# Patient Record
Sex: FEMALE | Race: WHITE | NOT HISPANIC OR LATINO | Employment: FULL TIME | URBAN - METROPOLITAN AREA
[De-identification: names, ages, dates, MRNs, and addresses within clinical notes are randomized per-mention and may not be internally consistent; named-entity substitution may affect disease eponyms.]

---

## 2017-05-10 RX ORDER — MESALAMINE 1.2 G/1
TABLET, DELAYED RELEASE ORAL
Qty: 180 TABLET | Refills: 2 | OUTPATIENT
Start: 2017-05-10

## 2017-09-26 ENCOUNTER — OFFICE VISIT (OUTPATIENT)
Dept: GASTROENTEROLOGY | Facility: CLINIC | Age: 37
End: 2017-09-26

## 2017-09-26 VITALS
WEIGHT: 132.6 LBS | HEIGHT: 63 IN | BODY MASS INDEX: 23.5 KG/M2 | TEMPERATURE: 98.2 F | SYSTOLIC BLOOD PRESSURE: 112 MMHG | DIASTOLIC BLOOD PRESSURE: 64 MMHG

## 2017-09-26 DIAGNOSIS — Z86.2 HISTORY OF ANEMIA: ICD-10-CM

## 2017-09-26 DIAGNOSIS — K21.9 GASTROESOPHAGEAL REFLUX DISEASE, ESOPHAGITIS PRESENCE NOT SPECIFIED: ICD-10-CM

## 2017-09-26 DIAGNOSIS — K51.211 ULCERATIVE PROCTITIS WITH RECTAL BLEEDING (HCC): Primary | ICD-10-CM

## 2017-09-26 LAB
ALBUMIN SERPL-MCNC: 4.1 G/DL (ref 3.5–5.2)
ALBUMIN/GLOB SERPL: 1.5 G/DL
ALP SERPL-CCNC: 40 U/L (ref 39–117)
ALT SERPL-CCNC: 9 U/L (ref 1–33)
AST SERPL-CCNC: 13 U/L (ref 1–32)
BASOPHILS # BLD AUTO: 0.01 10*3/MM3 (ref 0–0.2)
BASOPHILS NFR BLD AUTO: 0.1 % (ref 0–1.5)
BILIRUB SERPL-MCNC: 0.5 MG/DL (ref 0.1–1.2)
BUN SERPL-MCNC: 12 MG/DL (ref 6–20)
BUN/CREAT SERPL: 15 (ref 7–25)
CALCIUM SERPL-MCNC: 10 MG/DL (ref 8.6–10.5)
CHLORIDE SERPL-SCNC: 103 MMOL/L (ref 98–107)
CO2 SERPL-SCNC: 25.9 MMOL/L (ref 22–29)
CREAT SERPL-MCNC: 0.8 MG/DL (ref 0.57–1)
CRP SERPL-MCNC: <0.03 MG/DL (ref 0–0.5)
EOSINOPHIL # BLD AUTO: 0.12 10*3/MM3 (ref 0–0.7)
EOSINOPHIL NFR BLD AUTO: 1.6 % (ref 0.3–6.2)
ERYTHROCYTE [DISTWIDTH] IN BLOOD BY AUTOMATED COUNT: 13.1 % (ref 11.7–13)
ERYTHROCYTE [SEDIMENTATION RATE] IN BLOOD BY WESTERGREN METHOD: 6 MM/HR (ref 0–20)
GLOBULIN SER CALC-MCNC: 2.7 GM/DL
GLUCOSE SERPL-MCNC: 82 MG/DL (ref 65–99)
HCT VFR BLD AUTO: 39.2 % (ref 35.6–45.5)
HGB BLD-MCNC: 12.6 G/DL (ref 11.9–15.5)
IMM GRANULOCYTES # BLD: 0.02 10*3/MM3 (ref 0–0.03)
IMM GRANULOCYTES NFR BLD: 0.3 % (ref 0–0.5)
IRON SATN MFR SERPL: 31 % (ref 20–50)
IRON SERPL-MCNC: 102 MCG/DL (ref 37–145)
LYMPHOCYTES # BLD AUTO: 1.96 10*3/MM3 (ref 0.9–4.8)
LYMPHOCYTES NFR BLD AUTO: 26 % (ref 19.6–45.3)
MCH RBC QN AUTO: 30 PG (ref 26.9–32)
MCHC RBC AUTO-ENTMCNC: 32.1 G/DL (ref 32.4–36.3)
MCV RBC AUTO: 93.3 FL (ref 80.5–98.2)
MONOCYTES # BLD AUTO: 0.49 10*3/MM3 (ref 0.2–1.2)
MONOCYTES NFR BLD AUTO: 6.5 % (ref 5–12)
NEUTROPHILS # BLD AUTO: 4.93 10*3/MM3 (ref 1.9–8.1)
NEUTROPHILS NFR BLD AUTO: 65.5 % (ref 42.7–76)
PLATELET # BLD AUTO: 292 10*3/MM3 (ref 140–500)
POTASSIUM SERPL-SCNC: 4.8 MMOL/L (ref 3.5–5.2)
PROT SERPL-MCNC: 6.8 G/DL (ref 6–8.5)
RBC # BLD AUTO: 4.2 10*6/MM3 (ref 3.9–5.2)
SODIUM SERPL-SCNC: 140 MMOL/L (ref 136–145)
TIBC SERPL-MCNC: 332 MCG/DL
UIBC SERPL-MCNC: 230 MCG/DL
WBC # BLD AUTO: 7.53 10*3/MM3 (ref 4.5–10.7)

## 2017-09-26 PROCEDURE — 99214 OFFICE O/P EST MOD 30 MIN: CPT | Performed by: NURSE PRACTITIONER

## 2017-09-26 RX ORDER — MESALAMINE 4 G/60ML
4 ENEMA RECTAL SEE ADMIN INSTRUCTIONS
Qty: 30 BOTTLE | Refills: 5 | Status: SHIPPED | OUTPATIENT
Start: 2017-09-26 | End: 2017-12-07 | Stop reason: SDUPTHER

## 2017-09-26 RX ORDER — RANITIDINE 150 MG/1
150 TABLET ORAL 2 TIMES DAILY
Qty: 60 TABLET | Refills: 11 | Status: SHIPPED | OUTPATIENT
Start: 2017-09-26

## 2017-09-26 RX ORDER — MESALAMINE 1.2 G/1
1200 TABLET, DELAYED RELEASE ORAL 4 TIMES DAILY
Qty: 120 TABLET | Refills: 11 | Status: SHIPPED | OUTPATIENT
Start: 2017-09-26 | End: 2019-02-20 | Stop reason: SDUPTHER

## 2017-09-26 NOTE — PROGRESS NOTES
Chief Complaint   Patient presents with   • Follow-up     medication refill, check up    • Heartburn         HPI    Pt is being seen today for A follow up for UC proctitis.  She is followed by Dr. Street in last seen in the office in February 2016.  Treatment includes Rowasa enemas and Lialda daily.  She is been out of her medications for several months.  As a result she states she is having a mild flare.  Flare consisted of one to 2 bloody semi-loose stools per day with mucus.  Mild to moderate amount of blood in stool . She denies abdominal pain, fever, chills, weight loss, nocturnal awakenings.  She denies extraintestinal manifestations.  Her current GI symptoms are not interfering with her daily life.  She reports a history of anemia but states she has not felt increased fatigue, dizziness, or lightheadedness. She is due for colonoscopy as her last one was in 2011.  She her reports frequent heartburn and reflux. She has tried altering her diet but continues to have daily heartburn.  She does drink several cups of coffee or day.  She denies NSAID use.  She denies hematemesis or melena.  She denies early satiety, anorexia, dysphagia or odynophagia..     Past Medical History:   Diagnosis Date   • Anemia    • UC (ulcerative colitis)     dx in 2006     Past Surgical History:   Procedure Laterality Date   • COLONOSCOPY  12/02/2011    revealed some active colitis (found in a note from [facility] in [facility])       Current Outpatient Prescriptions   Medication Sig Dispense Refill   • mesalamine (LIALDA) 1.2 g EC tablet Take 1 tablet by mouth 4 (Four) Times a Day. 120 tablet 11   • mesalamine (ROWASA) 4 g enema Insert 1 enema into the rectum See Admin Instructions. One enema in rectum nightly, try and retain overnight 30 bottle 5   • raNITIdine (ZANTAC) 150 MG tablet Take 1 tablet by mouth 2 (Two) Times a Day. 60 tablet 11     No current facility-administered medications for this visit.        PRN Meds:.    No Known  "Allergies    Social History     Social History   • Marital status:      Spouse name: N/A   • Number of children: N/A   • Years of education: N/A     Occupational History   • Not on file.     Social History Main Topics   • Smoking status: Never Smoker   • Smokeless tobacco: Not on file   • Alcohol use Yes   • Drug use: Not on file   • Sexual activity: Not on file     Other Topics Concern   • Not on file     Social History Narrative       History reviewed. No pertinent family history.    Review of Systems   Constitutional: Negative for appetite change, chills, diaphoresis, fatigue, fever and unexpected weight change.   HENT: Negative for trouble swallowing.    Respiratory: Negative for choking and shortness of breath.    Cardiovascular: Negative for chest pain.   Gastrointestinal: Positive for blood in stool and diarrhea. Negative for abdominal distention, abdominal pain, constipation, nausea and vomiting.        Mucus stools   Musculoskeletal: Negative for back pain.   Skin: Negative for color change.   Allergic/Immunologic: Negative for immunocompromised state.   Neurological: Negative for dizziness.   Hematological: Does not bruise/bleed easily.   Psychiatric/Behavioral: Negative.        Vitals:    09/26/17 0817   BP: 112/64   Temp: 98.2 °F (36.8 °C)     /64 (BP Location: Left arm, Patient Position: Sitting)  Temp 98.2 °F (36.8 °C) (Oral)   Ht 63\" (160 cm)  Wt 132 lb 9.6 oz (60.1 kg)  BMI 23.49 kg/m2  Physical Exam   Constitutional: She is oriented to person, place, and time. She appears well-developed and well-nourished.   HENT:   Head: Normocephalic and atraumatic.   Eyes: Pupils are equal, round, and reactive to light.   Cardiovascular: Normal rate, regular rhythm and normal heart sounds.    Pulmonary/Chest: Effort normal and breath sounds normal.   Abdominal: Soft. Bowel sounds are normal. She exhibits no shifting dullness, no distension, no pulsatile liver, no fluid wave, no abdominal bruit, " no ascites, no pulsatile midline mass and no mass. There is no hepatosplenomegaly. There is no tenderness. There is no rigidity and no guarding. No hernia.   Musculoskeletal: Normal range of motion.   Neurological: She is alert and oriented to person, place, and time.   Skin: Skin is warm and dry.   Psychiatric: She has a normal mood and affect. Her behavior is normal. Thought content normal.   Nursing note and vitals reviewed.      ASSESSMENT AND PLAN    Dione was seen today for follow-up and heartburn.    Diagnoses and all orders for this visit:    Ulcerative proctitis with rectal bleeding  Comments:  start lialda 4 tabs daily. If no improvment in 2-4 weeks consider steroid taper.   Orders:  -     Case Request; Standing  -     Case Request  -     Comprehensive Metabolic Panel  -     Sedimentation Rate  -     C-reactive Protein    Gastroesophageal reflux disease, esophagitis presence not specified  Comments:  ranitidine BID  Orders:  -     Case Request; Standing  -     Case Request    History of anemia  -     CBC & Differential  -     Iron Profile    Other orders  -     Implement Anesthesia orders day of procedure.; Standing  -     Obtain informed consent; Standing  -     Verify bowel prep was successful; Standing  -     Give tap water enema if bowel prep was insufficient; Standing  -     mesalamine (ROWASA) 4 g enema; Insert 1 enema into the rectum See Admin Instructions. One enema in rectum nightly, try and retain overnight  -     mesalamine (LIALDA) 1.2 g EC tablet; Take 1 tablet by mouth 4 (Four) Times a Day.  -     raNITIdine (ZANTAC) 150 MG tablet; Take 1 tablet by mouth 2 (Two) Times a Day.      EGD and colonoscopy with Dr Street. Check routine labs.   Please begin a probiotic.  You can try activia yogurt, align, or florastor.  These can be found over-the-counter at a local grocery store.    For any additional questions, concerns or changes to your condition after today's office visit please contact the  office at [phone].        Irlanda Moreno  APRMAG   [facility]  [facility] [place] [address]  Office: [phone]

## 2017-09-29 ENCOUNTER — TELEPHONE (OUTPATIENT)
Dept: GASTROENTEROLOGY | Facility: CLINIC | Age: 37
End: 2017-09-29

## 2017-09-29 NOTE — TELEPHONE ENCOUNTER
Called pt and advised that per Irlanda: her labs look fine- no inflammation or anemia and no indication for steroids. Pt verb understanding.

## 2017-09-29 NOTE — TELEPHONE ENCOUNTER
----- Message from MISBAH Gastelum sent at 9/27/2017  1:29 PM EDT -----  Please inform patient labs look fine- no inflammation or anemia..  No indication for steroids

## 2017-12-07 RX ORDER — MESALAMINE 4 G/60ML
ENEMA RECTAL
Qty: 1680 ML | Refills: 5 | Status: SHIPPED | OUTPATIENT
Start: 2017-12-07 | End: 2021-12-16

## 2018-02-21 PROBLEM — K21.9 GASTROESOPHAGEAL REFLUX DISEASE: Status: ACTIVE | Noted: 2018-02-21

## 2018-02-21 PROBLEM — K51.211 ULCERATIVE PROCTITIS WITH RECTAL BLEEDING (HCC): Status: ACTIVE | Noted: 2018-02-21

## 2018-03-19 ENCOUNTER — ANESTHESIA (OUTPATIENT)
Dept: GASTROENTEROLOGY | Facility: HOSPITAL | Age: 38
End: 2018-03-19

## 2018-03-19 ENCOUNTER — ANESTHESIA EVENT (OUTPATIENT)
Dept: GASTROENTEROLOGY | Facility: HOSPITAL | Age: 38
End: 2018-03-19

## 2018-03-19 ENCOUNTER — HOSPITAL ENCOUNTER (OUTPATIENT)
Facility: HOSPITAL | Age: 38
Setting detail: HOSPITAL OUTPATIENT SURGERY
Discharge: HOME | End: 2018-03-19
Attending: INTERNAL MEDICINE | Admitting: INTERNAL MEDICINE

## 2018-03-19 VITALS
SYSTOLIC BLOOD PRESSURE: 95 MMHG | HEART RATE: 59 BPM | TEMPERATURE: 97.9 F | HEIGHT: 63 IN | DIASTOLIC BLOOD PRESSURE: 65 MMHG | RESPIRATION RATE: 14 BRPM | OXYGEN SATURATION: 100 % | WEIGHT: 125.13 LBS | BODY MASS INDEX: 22.17 KG/M2

## 2018-03-19 DIAGNOSIS — K21.9 GASTROESOPHAGEAL REFLUX DISEASE, ESOPHAGITIS PRESENCE NOT SPECIFIED: ICD-10-CM

## 2018-03-19 DIAGNOSIS — K51.211 ULCERATIVE PROCTITIS WITH RECTAL BLEEDING (HCC): ICD-10-CM

## 2018-03-19 LAB
B-HCG UR QL: NEGATIVE
INTERNAL NEGATIVE CONTROL: NEGATIVE
INTERNAL POSITIVE CONTROL: POSITIVE
Lab: NORMAL

## 2018-03-19 PROCEDURE — 45380 COLONOSCOPY AND BIOPSY: CPT | Performed by: INTERNAL MEDICINE

## 2018-03-19 PROCEDURE — S0260 H&P FOR SURGERY: HCPCS | Performed by: INTERNAL MEDICINE

## 2018-03-19 PROCEDURE — 88305 TISSUE EXAM BY PATHOLOGIST: CPT | Performed by: INTERNAL MEDICINE

## 2018-03-19 PROCEDURE — 25010000002 PROPOFOL 10 MG/ML EMULSION: Performed by: ANESTHESIOLOGY

## 2018-03-19 PROCEDURE — 43239 EGD BIOPSY SINGLE/MULTIPLE: CPT | Performed by: INTERNAL MEDICINE

## 2018-03-19 RX ORDER — PROMETHAZINE HYDROCHLORIDE 25 MG/ML
6.25 INJECTION, SOLUTION INTRAMUSCULAR; INTRAVENOUS ONCE AS NEEDED
Status: DISCONTINUED | OUTPATIENT
Start: 2018-03-19 | End: 2018-03-19 | Stop reason: HOSPADM

## 2018-03-19 RX ORDER — PROPOFOL 10 MG/ML
VIAL (ML) INTRAVENOUS AS NEEDED
Status: DISCONTINUED | OUTPATIENT
Start: 2018-03-19 | End: 2018-03-19 | Stop reason: SURG

## 2018-03-19 RX ORDER — SODIUM CHLORIDE, SODIUM LACTATE, POTASSIUM CHLORIDE, CALCIUM CHLORIDE 600; 310; 30; 20 MG/100ML; MG/100ML; MG/100ML; MG/100ML
1000 INJECTION, SOLUTION INTRAVENOUS CONTINUOUS PRN
Status: DISCONTINUED | OUTPATIENT
Start: 2018-03-19 | End: 2018-03-19 | Stop reason: HOSPADM

## 2018-03-19 RX ORDER — PROMETHAZINE HYDROCHLORIDE 25 MG/1
25 TABLET ORAL ONCE AS NEEDED
Status: DISCONTINUED | OUTPATIENT
Start: 2018-03-19 | End: 2018-03-19 | Stop reason: HOSPADM

## 2018-03-19 RX ORDER — LIDOCAINE HYDROCHLORIDE 20 MG/ML
INJECTION, SOLUTION INFILTRATION; PERINEURAL AS NEEDED
Status: DISCONTINUED | OUTPATIENT
Start: 2018-03-19 | End: 2018-03-19 | Stop reason: SURG

## 2018-03-19 RX ORDER — LIDOCAINE HYDROCHLORIDE 10 MG/ML
0.5 INJECTION, SOLUTION INFILTRATION; PERINEURAL ONCE AS NEEDED
Status: DISCONTINUED | OUTPATIENT
Start: 2018-03-19 | End: 2018-03-19 | Stop reason: HOSPADM

## 2018-03-19 RX ORDER — SODIUM CHLORIDE 0.9 % (FLUSH) 0.9 %
3 SYRINGE (ML) INJECTION AS NEEDED
Status: DISCONTINUED | OUTPATIENT
Start: 2018-03-19 | End: 2018-03-19 | Stop reason: HOSPADM

## 2018-03-19 RX ORDER — PROMETHAZINE HYDROCHLORIDE 25 MG/1
25 SUPPOSITORY RECTAL ONCE AS NEEDED
Status: DISCONTINUED | OUTPATIENT
Start: 2018-03-19 | End: 2018-03-19 | Stop reason: HOSPADM

## 2018-03-19 RX ORDER — PROPOFOL 10 MG/ML
VIAL (ML) INTRAVENOUS CONTINUOUS PRN
Status: DISCONTINUED | OUTPATIENT
Start: 2018-03-19 | End: 2018-03-19 | Stop reason: SURG

## 2018-03-19 RX ORDER — ONDANSETRON 2 MG/ML
4 INJECTION INTRAMUSCULAR; INTRAVENOUS ONCE AS NEEDED
Status: DISCONTINUED | OUTPATIENT
Start: 2018-03-19 | End: 2018-03-19 | Stop reason: HOSPADM

## 2018-03-19 RX ADMIN — LIDOCAINE HYDROCHLORIDE 60 MG: 20 INJECTION, SOLUTION INFILTRATION; PERINEURAL at 08:48

## 2018-03-19 RX ADMIN — PROPOFOL 100 MCG/KG/MIN: 10 INJECTION, EMULSION INTRAVENOUS at 08:48

## 2018-03-19 RX ADMIN — PROPOFOL 100 MG: 10 INJECTION, EMULSION INTRAVENOUS at 08:48

## 2018-03-19 RX ADMIN — SODIUM CHLORIDE, POTASSIUM CHLORIDE, SODIUM LACTATE AND CALCIUM CHLORIDE: 600; 310; 30; 20 INJECTION, SOLUTION INTRAVENOUS at 08:48

## 2018-03-19 RX ADMIN — SODIUM CHLORIDE, POTASSIUM CHLORIDE, SODIUM LACTATE AND CALCIUM CHLORIDE 1000 ML: 600; 310; 30; 20 INJECTION, SOLUTION INTRAVENOUS at 08:03

## 2018-03-19 NOTE — ANESTHESIA PREPROCEDURE EVALUATION
Anesthesia Evaluation     Patient summary reviewed   no history of anesthetic complications:  NPO Solid Status: > 6 hours             Airway   Mallampati: II  TM distance: >3 FB  Dental - normal exam     Pulmonary - normal exam   (-) not a smoker  Cardiovascular - normal exam        Neuro/Psych  GI/Hepatic/Renal/Endo    (+)  GERD well controlled, GI bleeding,     Musculoskeletal     Abdominal    Substance History      OB/GYN          Other                        Anesthesia Plan    ASA 2     MAC     Anesthetic plan and risks discussed with patient.

## 2018-03-19 NOTE — H&P
"[facility]  Pre Procedure History & Physical    Chief Complaint:   GERD and ulcerative proctitis    Subjective     HPI:   7-year-old female with history of ulcerative proctitis diagnosed in 2006 as well as recent onset GERD.  Patient had exacerbation in September and now back on 2 Lialda daily with quiescent proctitis.  Patient reports GERD is better controlled on ranitidine but still gets nauseated after eating.  Patient here for further evaluation.  Patient for EGD and colonoscopy.    Past Medical History:   Past Medical History:   Diagnosis Date   • Anemia    • UC (ulcerative colitis)     dx in 2006       Past Surgical History:  Past Surgical History:   Procedure Laterality Date   • COLONOSCOPY  12/02/2011    revealed some active colitis (found in a note from [facility] records in [facility])       Family History:  Family History   Problem Relation Age of Onset   • Malig Hyperthermia Neg Hx        Social History:   reports that she has never smoked. She has never used smokeless tobacco. She reports that she drinks alcohol. She reports that she does not use drugs.    Medications:   Prescriptions Prior to Admission   Medication Sig Dispense Refill Last Dose   • mesalamine (LIALDA) 1.2 g EC tablet Take 1 tablet by mouth 4 (Four) Times a Day. 120 tablet 11 3/17/2018   • raNITIdine (ZANTAC) 150 MG tablet Take 1 tablet by mouth 2 (Two) Times a Day. 60 tablet 11 3/17/2018   • mesalamine (ROWASA) 4 g enema INSERT 1 ENEMA RECTALLY NIGHTLY (TRY AND RETAIN OVERNIGHT) (SEE ADMINISTRATION INSTRUCTIONS) 1680 mL 5 Unknown at Unknown time       Allergies:  Review of patient's allergies indicates no known allergies.    ROS:    Pertinent items are noted in HPI     Objective     Blood pressure 101/68, pulse 57, temperature 97.9 °F (36.6 °C), temperature source Oral, resp. rate 16, height 160 cm (63\"), weight 56.8 kg (125 lb 2 oz), SpO2 100 %.    Physical Exam   Constitutional: Pt is oriented to person, place, and " time and well-developed, well-nourished, and in no distress.   Mouth/Throat: Oropharynx is clear and moist.   Neck: Normal range of motion.   Cardiovascular: Normal rate, regular rhythm and normal heart sounds.    Pulmonary/Chest: Effort normal and breath sounds normal.   Abdominal: Soft. Nontender  Skin: Skin is warm and dry.   Psychiatric: Mood, memory, affect and judgment normal.     Assessment/Plan     Diagnosis:  Ulcerative proctitis  GERD    Anticipated Surgical Procedure:  EGD and colonoscopy    The risks, benefits, and alternatives of this procedure have been discussed with the patient or the responsible party- the patient understands and agrees to proceed.

## 2018-03-19 NOTE — ANESTHESIA POSTPROCEDURE EVALUATION
Patient: Dione Dick    Procedure Summary     Date:  03/19/18 Room / Location:   [facility]    Anesthesia Start:  0848 Anesthesia Stop:  0921    Procedures:       ESOPHAGOGASTRODUODENOSCOPY WITH COLD BIOPSIES (N/A Esophagus)      COLONOSCOPY TO CECUM AND INTO TERMINAL ILEUM WITH COLD BIOPSIES (N/A ) Diagnosis:       Gastroesophageal reflux disease, esophagitis presence not specified      Ulcerative proctitis with rectal bleeding      (Gastroesophageal reflux disease, esophagitis presence not specified [K21.9])      (Ulcerative proctitis with rectal bleeding [K51.211])    Surgeon:  Salvador Street MD Provider:  Adriel Chaidez MD    Anesthesia Type:  MAC ASA Status:  2          Anesthesia Type: MAC  Last vitals  BP   90/54 (03/19/18 0920)   Temp   36.6 °C (97.9 °F) (03/19/18 0750)   Pulse   64 (03/19/18 0920)   Resp   14 (03/19/18 0920)     SpO2   98 % (03/19/18 0920)     Post Anesthesia Care and Evaluation    Patient location during evaluation: PHASE II  Anesthetic complications: No anesthetic complications

## 2018-03-19 NOTE — BRIEF OP NOTE
ESOPHAGOGASTRODUODENOSCOPY, COLONOSCOPY  Progress Note    Dione Dick  3/19/2018    Pre-op Diagnosis:   Gastroesophageal reflux disease, esophagitis presence not specified [K21.9]  Ulcerative proctitis with rectal bleeding [K51.211]       Post-Op Diagnosis Codes:     * Gastroesophageal reflux disease, esophagitis presence not specified [K21.9]     * Ulcerative proctitis with rectal bleeding [K51.211]    Procedure/CPT® Codes:      Procedure(s):  ESOPHAGOGASTRODUODENOSCOPY WITH COLD BIOPSIES  COLONOSCOPY TO CECUM AND INTO TERMINAL ILEUM WITH COLD BIOPSIES    Surgeon(s):  Salvador Street MD    Anesthesia: Monitor Anesthesia Care    Staff:   Endo Technician: Aimee Mix  Endo Nurse: Sepideh Bryant RN    Estimated Blood Loss: minimal    Urine Voided: * No values recorded between 3/19/2018  8:47 AM and 3/19/2018  9:17 AM *    Specimens:                ID Type Source Tests Collected by Time   A : SIGMOID COLON BIOPSIES Tissue Large Intestine, Sigmoid Colon TISSUE PATHOLOGY EXAM Salvador Street MD 3/19/2018 0903   B : RECTAL BIOPSIES Tissue Large Intestine, Rectum TISSUE PATHOLOGY EXAM Salvador Street MD 3/19/2018 0904   C : DUODENAL BIOPSIES Tissue Small Intestine, Duodenum TISSUE PATHOLOGY EXAM Salvador Street MD 3/19/2018 0914         Drains:      Findings: Colonoscopy within normal limits biopsies of the sigmoid and rectum performed due to history of proctitis  EGD with hiatal hernia mucosa otherwise normal.  Biopsies of the duodenum taken to rule out celiac    Complications: None      Salvador Street MD     Date: 3/19/2018  Time: 9:18 AM

## 2018-03-20 LAB
CYTO UR: NORMAL
LAB AP CASE REPORT: NORMAL
Lab: NORMAL
PATH REPORT.FINAL DX SPEC: NORMAL
PATH REPORT.GROSS SPEC: NORMAL

## 2018-04-16 ENCOUNTER — TELEPHONE (OUTPATIENT)
Dept: GASTROENTEROLOGY | Facility: CLINIC | Age: 38
End: 2018-04-16

## 2018-04-16 NOTE — TELEPHONE ENCOUNTER
----- Message from Salvador Street MD sent at 4/16/2018  9:34 AM EDT -----  Biopsies all normal.  Continue current medications and call if symptoms not resolved.

## 2019-02-20 ENCOUNTER — OFFICE VISIT (OUTPATIENT)
Dept: GASTROENTEROLOGY | Facility: CLINIC | Age: 39
End: 2019-02-20

## 2019-02-20 VITALS
BODY MASS INDEX: 23.5 KG/M2 | TEMPERATURE: 98.2 F | DIASTOLIC BLOOD PRESSURE: 72 MMHG | WEIGHT: 132.6 LBS | HEIGHT: 63 IN | SYSTOLIC BLOOD PRESSURE: 116 MMHG

## 2019-02-20 DIAGNOSIS — K51.90 ULCERATIVE COLITIS WITHOUT COMPLICATIONS, UNSPECIFIED LOCATION (HCC): ICD-10-CM

## 2019-02-20 DIAGNOSIS — K21.9 GASTROESOPHAGEAL REFLUX DISEASE, ESOPHAGITIS PRESENCE NOT SPECIFIED: Primary | ICD-10-CM

## 2019-02-20 PROCEDURE — 99214 OFFICE O/P EST MOD 30 MIN: CPT | Performed by: NURSE PRACTITIONER

## 2019-02-20 RX ORDER — MESALAMINE 1.2 G/1
4800 TABLET, DELAYED RELEASE ORAL DAILY
Qty: 360 TABLET | Refills: 3 | Status: SHIPPED | OUTPATIENT
Start: 2019-02-20 | End: 2020-09-24 | Stop reason: SDUPTHER

## 2019-02-20 NOTE — PROGRESS NOTES
Chief Complaint   Patient presents with   • Follow-up     medication refill        Dione Dick is a  38 y.o. female here for a follow up visit for UC and GERD.    HPI  37 yo f presents today for follow up visit for ulcerative colitis and GERD. She is a patient of Dr. Street. She was last seen in the office on 9/2017. She has hx ulcerative colitis since 2006 and admits she does well with 2 lialda daily. She denies any recent flares. She also has hx GERD and admits she takes Zantac OTC PRN. She denies any dysphagia, reflux, abd pain, N&V, diarrhea, constipation, rectal bleeding or melena. She admits her appetite is good and her weight is stable.     Her last EGD and Colonoscopy were done 3/2018.   Past Medical History:   Diagnosis Date   • Anemia    • UC (ulcerative colitis) (CMS/HCC)     dx in 2006       Past Surgical History:   Procedure Laterality Date   • COLONOSCOPY  12/02/2011    revealed some active colitis (found in a note from [facility] records in [facility])   • COLONOSCOPY N/A 3/19/2018    Diverticulosis   • ENDOSCOPY N/A 3/19/2018           Scheduled Meds:    Continuous Infusions:  No current facility-administered medications for this visit.     PRN Meds:.    No Known Allergies    Social History     Socioeconomic History   • Marital status:      Spouse name: Not on file   • Number of children: Not on file   • Years of education: Not on file   • Highest education level: Not on file   Social Needs   • Financial resource strain: Not on file   • Food insecurity - worry: Not on file   • Food insecurity - inability: Not on file   • Transportation needs - medical: Not on file   • Transportation needs - non-medical: Not on file   Occupational History   • Not on file   Tobacco Use   • Smoking status: Never Smoker   • Smokeless tobacco: Never Used   Substance and Sexual Activity   • Alcohol use: Yes     Comment: SOCIAL   • Drug use: No   • Sexual activity: Defer   Other Topics Concern   • Not on file   Social  History Narrative   • Not on file       Family History   Problem Relation Age of Onset   • Malig Hyperthermia Neg Hx        Review of Systems   Constitutional: Negative for appetite change, chills, diaphoresis, fatigue, fever and unexpected weight change.   HENT: Negative for nosebleeds, postnasal drip, sore throat, trouble swallowing and voice change.    Respiratory: Negative for cough, choking, chest tightness, shortness of breath and wheezing.    Cardiovascular: Negative for chest pain.   Gastrointestinal: Negative for abdominal distention, abdominal pain, anal bleeding, blood in stool, constipation, diarrhea, nausea, rectal pain and vomiting.   Endocrine: Negative for polydipsia, polyphagia and polyuria.   Musculoskeletal: Negative for gait problem.   Skin: Negative for rash and wound.   Allergic/Immunologic: Negative for food allergies.   Neurological: Negative for dizziness, speech difficulty and light-headedness.   Psychiatric/Behavioral: Negative for confusion, self-injury, sleep disturbance and suicidal ideas.       Vitals:    02/20/19 0835   BP: 116/72   Temp: 98.2 °F (36.8 °C)       Physical Exam   Constitutional: She is oriented to person, place, and time. She appears well-developed and well-nourished. She does not appear ill. No distress.   HENT:   Head: Normocephalic.   Eyes: Pupils are equal, round, and reactive to light.   Cardiovascular: Normal rate, regular rhythm and normal heart sounds.   Pulmonary/Chest: Effort normal and breath sounds normal.   Abdominal: Soft. Bowel sounds are normal. She exhibits no distension and no mass. There is no hepatosplenomegaly. There is no tenderness. There is no rebound and no guarding. No hernia.   Musculoskeletal: Normal range of motion.   Neurological: She is alert and oriented to person, place, and time.   Skin: Skin is warm and dry.   Psychiatric: She has a normal mood and affect. Her speech is normal and behavior is normal. Judgment normal.       No images  are attached to the encounter.    Assessment & plan     1. Gastroesophageal reflux disease, esophagitis presence not specified    2. Ulcerative colitis without complications, unspecified location (CMS/HCC)  - mesalamine (LIALDA) 1.2 g EC tablet; Take 4 tablets by mouth Daily.  Dispense: 360 tablet; Refill: 3    GERD is well controlled on Zantac OTC. UC is well controlled on Lialda 2 tabs daily. Will refill it x 1 year. She had labs done at PCP recently. Call office with any issues. Follow up with me or Dr. Street in 1 year.

## 2020-07-11 DIAGNOSIS — K51.90 ULCERATIVE COLITIS WITHOUT COMPLICATIONS, UNSPECIFIED LOCATION (HCC): ICD-10-CM

## 2020-07-13 RX ORDER — MESALAMINE 1.2 G/1
TABLET, DELAYED RELEASE ORAL
Qty: 360 TABLET | Refills: 3 | OUTPATIENT
Start: 2020-07-13

## 2020-08-20 ENCOUNTER — TELEPHONE (OUTPATIENT)
Dept: GASTROENTEROLOGY | Facility: CLINIC | Age: 40
End: 2020-08-20

## 2020-08-20 RX ORDER — MESALAMINE 1.2 G/1
4800 TABLET, DELAYED RELEASE ORAL
Qty: 120 TABLET | Refills: 0 | Status: SHIPPED | OUTPATIENT
Start: 2020-08-20 | End: 2020-09-24 | Stop reason: SDUPTHER

## 2020-08-20 NOTE — TELEPHONE ENCOUNTER
Med e-scribed.     Called pt and advised that I have refilled her Lialda for her. Pt verb understanding.

## 2020-08-20 NOTE — TELEPHONE ENCOUNTER
----- Message from Awais Lu sent at 8/19/2020  4:10 PM EDT -----  Contact: [phone]  Patient needs a refill of mesalamine (LIALDA) 1.2 g EC tablet [64910]. Patient has appointment for 09-24-20 but patient will run out of medicine by then.  Can patient have refill to last her until her appointment.    [place] [address] [address] AT [facility] [address] - [phone]  - [phone] FX

## 2020-09-24 ENCOUNTER — OFFICE VISIT (OUTPATIENT)
Dept: GASTROENTEROLOGY | Facility: CLINIC | Age: 40
End: 2020-09-24

## 2020-09-24 VITALS — HEIGHT: 63 IN | WEIGHT: 144.4 LBS | BODY MASS INDEX: 25.59 KG/M2 | TEMPERATURE: 98.6 F

## 2020-09-24 DIAGNOSIS — K51.90 ULCERATIVE COLITIS WITHOUT COMPLICATIONS, UNSPECIFIED LOCATION (HCC): Primary | ICD-10-CM

## 2020-09-24 DIAGNOSIS — K51.211 ULCERATIVE PROCTITIS WITH RECTAL BLEEDING (HCC): ICD-10-CM

## 2020-09-24 LAB
BUN SERPL-MCNC: 11 MG/DL (ref 6–20)
BUN/CREAT SERPL: 14.5 (ref 7–25)
CALCIUM SERPL-MCNC: 9.8 MG/DL (ref 8.6–10.5)
CHLORIDE SERPL-SCNC: 102 MMOL/L (ref 98–107)
CO2 SERPL-SCNC: 26.9 MMOL/L (ref 22–29)
CREAT SERPL-MCNC: 0.76 MG/DL (ref 0.57–1)
ERYTHROCYTE [DISTWIDTH] IN BLOOD BY AUTOMATED COUNT: 11.8 % (ref 12.3–15.4)
ERYTHROCYTE [SEDIMENTATION RATE] IN BLOOD BY WESTERGREN METHOD: 6 MM/HR (ref 0–20)
GLUCOSE SERPL-MCNC: 88 MG/DL (ref 65–99)
HCT VFR BLD AUTO: 37.1 % (ref 34–46.6)
HGB BLD-MCNC: 12.5 G/DL (ref 12–15.9)
MCH RBC QN AUTO: 30.2 PG (ref 26.6–33)
MCHC RBC AUTO-ENTMCNC: 33.7 G/DL (ref 31.5–35.7)
MCV RBC AUTO: 89.6 FL (ref 79–97)
PLATELET # BLD AUTO: 282 10*3/MM3 (ref 140–450)
POTASSIUM SERPL-SCNC: 4.5 MMOL/L (ref 3.5–5.2)
RBC # BLD AUTO: 4.14 10*6/MM3 (ref 3.77–5.28)
SODIUM SERPL-SCNC: 138 MMOL/L (ref 136–145)
WBC # BLD AUTO: 7.06 10*3/MM3 (ref 3.4–10.8)

## 2020-09-24 PROCEDURE — 99212 OFFICE O/P EST SF 10 MIN: CPT | Performed by: INTERNAL MEDICINE

## 2020-09-24 RX ORDER — MESALAMINE 1.2 G/1
4800 TABLET, DELAYED RELEASE ORAL DAILY
Qty: 360 TABLET | Refills: 3 | Status: SHIPPED | OUTPATIENT
Start: 2020-09-24 | End: 2021-11-21

## 2020-09-24 RX ORDER — CETIRIZINE HYDROCHLORIDE 10 MG/1
10 TABLET ORAL DAILY
COMMUNITY

## 2020-09-24 NOTE — PROGRESS NOTES
Chief Complaint   Patient presents with   • Follow-up   • Ulcerative Colitis       Dione Dick is a  39 y.o. female here for a follow up visit for ulcerative proctitis med refills.    HPI     Patient 39-year-old female with history of anemia and ulcerative proctitis here for follow-up.  Patient had been doing very well over the last year but the last 2 days has noted an increase in stool frequency and some blood in the stool consistent with a flare.  Patient has already gone from 2 pills daily altitude for.  Patient here for med refill is low on the Aldara and needs refill.    Past Medical History:   Diagnosis Date   • Anemia    • UC (ulcerative colitis) (CMS/HCC)     dx in 2006         Current Outpatient Medications:   •  cetirizine (zyrTEC) 10 MG tablet, Take 10 mg by mouth Daily., Disp: , Rfl:   •  Famotidine-Ca Carb-Mag Hydrox (PEPCID COMPLETE PO), Take  by mouth Daily., Disp: , Rfl:   •  mesalamine (ROWASA) 4 g enema, INSERT 1 ENEMA RECTALLY NIGHTLY (TRY AND RETAIN OVERNIGHT) (SEE ADMINISTRATION INSTRUCTIONS), Disp: 1680 mL, Rfl: 5  •  MAGNESIUM CITRATE PO, Take  by mouth., Disp: , Rfl:   •  mesalamine (Lialda) 1.2 g EC tablet, Take 4 tablets by mouth Daily., Disp: 360 tablet, Rfl: 3  •  raNITIdine (ZANTAC) 150 MG tablet, Take 1 tablet by mouth 2 (Two) Times a Day., Disp: 60 tablet, Rfl: 11  •  TURMERIC PO, Take  by mouth., Disp: , Rfl:     No Known Allergies    Social History     Socioeconomic History   • Marital status:      Spouse name: Not on file   • Number of children: Not on file   • Years of education: Not on file   • Highest education level: Not on file   Tobacco Use   • Smoking status: Never Smoker   • Smokeless tobacco: Never Used   Substance and Sexual Activity   • Alcohol use: Yes     Comment: SOCIAL   • Drug use: No   • Sexual activity: Defer       Family History   Problem Relation Age of Onset   • Malig Hyperthermia Neg Hx        Review of Systems   Constitutional: Negative.     Respiratory: Negative.    Cardiovascular: Negative.    Gastrointestinal: Positive for anal bleeding and diarrhea. Negative for abdominal distention, blood in stool and constipation.   Musculoskeletal: Negative.    Skin: Negative.    Hematological: Negative.        Vitals:    09/24/20 0936   Temp: 98.6 °F (37 °C)       Physical Exam  Vitals signs reviewed.   Constitutional:       Appearance: She is well-developed.   HENT:      Head: Normocephalic and atraumatic.   Eyes:      General: No scleral icterus.     Pupils: Pupils are equal, round, and reactive to light.   Cardiovascular:      Rate and Rhythm: Normal rate and regular rhythm.      Heart sounds: Normal heart sounds.   Pulmonary:      Effort: Pulmonary effort is normal. No respiratory distress.      Breath sounds: Normal breath sounds.   Abdominal:      General: Bowel sounds are normal. There is no distension.      Palpations: Abdomen is soft. There is no mass.      Tenderness: There is no abdominal tenderness.      Hernia: No hernia is present.   Skin:     General: Skin is warm and dry.      Coloration: Skin is not jaundiced.      Findings: No rash.   Neurological:      Mental Status: She is alert and oriented to person, place, and time.   Psychiatric:         Behavior: Behavior normal.         Thought Content: Thought content normal.         Judgment: Judgment normal.         No visits with results within 2 Month(s) from this visit.   Latest known visit with results is:   Admission on 03/19/2018, Discharged on 03/19/2018   Component Date Value Ref Range Status   • HCG, Urine, QL 03/19/2018 Negative  Negative Final   • Lot Number 03/19/2018 [id number]   Final   • Internal Positive Control 03/19/2018 Positive   Final   • Internal Negative Control 03/19/2018 Negative   Final   • Case Report 03/19/2018    Final                    Value:Surgical Pathology Report                         Case: [id number]                                  Authorizing Provider:   Salvador Street MD     Collected:           03/19/2018 09:03 AM          Ordering Location:     [facility]  Received:            03/19/2018 10:20 AM                                 ENDO SUITES                                                                  Pathologist:           Aurelio Crawley MD                                                                           Specimens:   1) - Large Intestine, Sigmoid Colon, SIGMOID COLON BIOPSIES                                         2) - Large Intestine, Rectum, RECTAL BIOPSIES                                                       3) - Small Intestine, Duodenum, DUODENAL BIOPSIES                                         • Final Diagnosis 03/19/2018    Final                    Value:This result contains rich text formatting which cannot be displayed here.   • Gross Description 03/19/2018    Final                    Value:This result contains rich text formatting which cannot be displayed here.   • Microscopic Description 03/19/2018    Final                    Value:This result contains rich text formatting which cannot be displayed here.       Dione was seen today for follow-up and ulcerative colitis.    Diagnoses and all orders for this visit:    Ulcerative colitis without complications, unspecified location (CMS/HCC)  -     Basic Metabolic Panel  -     CBC (No Diff)  -     Sedimentation Rate  -     mesalamine (Lialda) 1.2 g EC tablet; Take 4 tablets by mouth Daily.    Ulcerative proctitis with rectal bleeding (CMS/HCC)      Patient 39-year-old female with history of ulcerative proctitis here for refill on Lialda.  Patient reports the last few days has noted an exacerbation of her symptoms and is gone from 2 pills a day maintenance to 4 pills a day.  Patient with some loosening of the stools and red blood noted here for med refill.  Patient is instructed that she  did well with going back to the 4-day if for signs or symptoms but may consider the Rowasa enemas she has at home to use topically until symptoms resolved.  We will refill Lialda

## 2020-10-12 ENCOUNTER — TELEPHONE (OUTPATIENT)
Dept: GASTROENTEROLOGY | Facility: CLINIC | Age: 40
End: 2020-10-12

## 2020-10-12 NOTE — TELEPHONE ENCOUNTER
----- Message from Awais Scott sent at 10/12/2020 10:59 AM EDT -----  Regarding: PT calling for Results from labs  PT calling for Results from labs. Said OK to leave on voicemail. [phone]

## 2020-10-12 NOTE — TELEPHONE ENCOUNTER
Patient called, no answer. Left message as per verbal permission from patient, advising as per Dr. Street's note. Advised to call back for questions.

## 2020-10-12 NOTE — TELEPHONE ENCOUNTER
Per Dr. Street: Labs normal, continue high-dose meds until symptoms resolve then can go back to 2 pills daily

## 2020-10-12 NOTE — TELEPHONE ENCOUNTER
----- Message from Salvador Street MD sent at 10/7/2020  1:17 PM EDT -----  Labs normal, continue high-dose meds until symptoms resolve then can go back to 2 pills daily

## 2021-10-13 ENCOUNTER — IMMUNIZATION (OUTPATIENT)
Dept: VACCINE CLINIC | Facility: HOSPITAL | Age: 41
End: 2021-10-13

## 2021-10-13 PROCEDURE — 91300 HC SARSCOV02 VAC 30MCG/0.3ML IM: CPT | Performed by: INTERNAL MEDICINE

## 2021-10-13 PROCEDURE — 0004A ADM SARSCOV2 30MCG/0.3ML BOOSTER: CPT | Performed by: INTERNAL MEDICINE

## 2021-11-18 DIAGNOSIS — K51.90 ULCERATIVE COLITIS WITHOUT COMPLICATIONS, UNSPECIFIED LOCATION (HCC): ICD-10-CM

## 2021-11-21 RX ORDER — MESALAMINE 1.2 G/1
TABLET, DELAYED RELEASE ORAL
Qty: 120 TABLET | Refills: 0 | Status: SHIPPED | OUTPATIENT
Start: 2021-11-21 | End: 2021-12-16 | Stop reason: SDUPTHER

## 2021-12-16 ENCOUNTER — TELEPHONE (OUTPATIENT)
Dept: GASTROENTEROLOGY | Facility: CLINIC | Age: 41
End: 2021-12-16

## 2021-12-16 ENCOUNTER — OFFICE VISIT (OUTPATIENT)
Dept: GASTROENTEROLOGY | Facility: CLINIC | Age: 41
End: 2021-12-16

## 2021-12-16 VITALS — WEIGHT: 147 LBS | TEMPERATURE: 97.3 F | BODY MASS INDEX: 26.05 KG/M2 | HEIGHT: 63 IN

## 2021-12-16 DIAGNOSIS — R11.0 NAUSEA: ICD-10-CM

## 2021-12-16 DIAGNOSIS — K51.211 ULCERATIVE PROCTITIS WITH RECTAL BLEEDING (HCC): Primary | ICD-10-CM

## 2021-12-16 DIAGNOSIS — Z83.71 FAMILY HISTORY OF POLYPS IN THE COLON: ICD-10-CM

## 2021-12-16 DIAGNOSIS — R10.13 DYSPEPSIA: ICD-10-CM

## 2021-12-16 PROCEDURE — 99214 OFFICE O/P EST MOD 30 MIN: CPT | Performed by: NURSE PRACTITIONER

## 2021-12-16 RX ORDER — MESALAMINE 1.2 G/1
4.8 TABLET, DELAYED RELEASE ORAL DAILY
Qty: 360 TABLET | Refills: 3 | Status: SHIPPED | OUTPATIENT
Start: 2021-12-16

## 2021-12-16 RX ORDER — ASCORBIC ACID 500 MG
500 TABLET ORAL DAILY
COMMUNITY

## 2021-12-16 NOTE — PROGRESS NOTES
"Chief Complaint   Patient presents with   • Rectal Bleeding       HPI    Dione Dick is a  41 y.o. female here for a follow up visit for rectal bleeding    This patient follows Dr. Street, new to me.    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  On and off Lialda over the past year.  Ran out of medication completely 2 months ago.  Has been having rectal bleeding with bright red blood on the toilet paper in the toilet bowl.  Bowels move on average twice a day.  Denies diarrhea.  Occasionally passes mucus.  No fecal urgency.  Had an old supply of Rowasa took intermittently.    Goes on to complain of nausea daily, intermittent dyspeptic symptoms takes digestive enzymes.  Leery of antacids prefers a more \"natural approach.\"  No dysphagia, odynophagia, or vomiting.  Appetite is good.  Her weight is stable.    Her father has a personal history of colon polyps.    Additional data reviewed:    EGD 2018 small hiatal hernia otherwise normal.  Colonoscopy 2018 diverticulosis otherwise normal.    Past Medical History:   Diagnosis Date   • Anemia    • UC (ulcerative colitis) (HCC)     dx in 2006       Past Surgical History:   Procedure Laterality Date   • COLONOSCOPY  12/02/2011    revealed some active colitis (found in a note from [facility] records in ed)   • COLONOSCOPY N/A 3/19/2018    Diverticulosis   • ENDOSCOPY N/A 3/19/2018           Scheduled Meds:     Continuous Infusions: No current facility-administered medications for this visit.      PRN Meds:     No Known Allergies    Social History     Socioeconomic History   • Marital status:    Tobacco Use   • Smoking status: Never Smoker   • Smokeless tobacco: Never Used   Substance and Sexual Activity   • Alcohol use: Yes     Comment: SOCIAL   • Drug use: No   • Sexual activity: Defer       Family History   Problem Relation Age of Onset   • Malig Hyperthermia Neg Hx        Review of Systems   Constitutional: Negative for activity " change, appetite change, fatigue, fever and unexpected weight change.   HENT: Negative for trouble swallowing.    Respiratory: Negative for apnea, cough, choking, chest tightness, shortness of breath and wheezing.    Cardiovascular: Negative for chest pain, palpitations and leg swelling.   Gastrointestinal: Positive for anal bleeding. Negative for abdominal distention, abdominal pain, blood in stool, constipation, diarrhea, nausea, rectal pain and vomiting.       Vitals:    12/16/21 0844   Temp: 97.3 °F (36.3 °C)       Physical Exam  Constitutional:       Appearance: She is well-developed.   Abdominal:      General: Bowel sounds are normal. There is no distension.      Palpations: Abdomen is soft. There is no mass.      Tenderness: There is no abdominal tenderness. There is no guarding.      Hernia: No hernia is present.   Skin:     General: Skin is warm and dry.      Capillary Refill: Capillary refill takes less than 2 seconds.   Neurological:      Mental Status: She is alert and oriented to person, place, and time.   Psychiatric:         Behavior: Behavior normal.       Assessment    Diagnoses and all orders for this visit:    1. Ulcerative proctitis with rectal bleeding (HCC) (Primary)  Overview:  Added automatically from request for surgery [id number]    Orders:  -     mesalamine (LIALDA) 1.2 g EC tablet; Take 4 tablets by mouth Daily.  Dispense: 360 tablet; Refill: 3  -     Case Request; Standing  -     Case Request  -     CBC & Differential  -     Comprehensive Metabolic Panel  -     C-reactive Protein  -     Sedimentation Rate    2. Dyspepsia  -     Case Request; Standing  -     Case Request    3. Nausea  -     Case Request; Standing  -     Case Request     Plan    Very pleasant 41-year-old female with a greater than 15-year history of ulcerative proctitis presenting today with rectal bleeding dyspepsia and nausea.  Recommend resumption of Lialda at 4 tablets once daily.  Arrange bidirectional endoscopic  evaluation with Dr. Street to rule out acid related damage and assess for colonic inflammation, colon polyps.  Labs today as above.  Discussed treatment options for dyspepsia and nausea with Pepcid or PPI therapy but patient prefers a more natural approach.  We discussed the benefits of FD guard.  Further recommendations and follow-up pending the aforementioned work-up.         MISBAH Alves  [facility]  [address] [place] [address]  Office: [phone]

## 2021-12-16 NOTE — TELEPHONE ENCOUNTER
ANDREA Bonds for colonoscopy/EGD on 02/23/2022  arrive at 1030am   . Gave Prep instructions in office. ----miralax      Advised PT  that  will call with final arrival time  24 hrs before procedure. If they do not get a phone call, arrival time will stay the same as given on instructions

## 2021-12-17 ENCOUNTER — TELEPHONE (OUTPATIENT)
Dept: GASTROENTEROLOGY | Facility: CLINIC | Age: 41
End: 2021-12-17

## 2021-12-17 LAB
ALBUMIN SERPL-MCNC: 4.2 G/DL (ref 3.8–4.8)
ALBUMIN/GLOB SERPL: 2 {RATIO} (ref 1.2–2.2)
ALP SERPL-CCNC: 53 IU/L (ref 44–121)
ALT SERPL-CCNC: 10 IU/L (ref 0–32)
AST SERPL-CCNC: 14 IU/L (ref 0–40)
BASOPHILS # BLD AUTO: 0 X10E3/UL (ref 0–0.2)
BASOPHILS NFR BLD AUTO: 0 %
BILIRUB SERPL-MCNC: 0.4 MG/DL (ref 0–1.2)
BUN SERPL-MCNC: 13 MG/DL (ref 6–24)
BUN/CREAT SERPL: 16 (ref 9–23)
CALCIUM SERPL-MCNC: 9.5 MG/DL (ref 8.7–10.2)
CHLORIDE SERPL-SCNC: 104 MMOL/L (ref 96–106)
CO2 SERPL-SCNC: 19 MMOL/L (ref 20–29)
CREAT SERPL-MCNC: 0.79 MG/DL (ref 0.57–1)
CRP SERPL-MCNC: <1 MG/L (ref 0–10)
EOSINOPHIL # BLD AUTO: 0.1 X10E3/UL (ref 0–0.4)
EOSINOPHIL NFR BLD AUTO: 2 %
ERYTHROCYTE [DISTWIDTH] IN BLOOD BY AUTOMATED COUNT: 11.8 % (ref 11.7–15.4)
ERYTHROCYTE [SEDIMENTATION RATE] IN BLOOD BY WESTERGREN METHOD: 3 MM/HR (ref 0–32)
GLOBULIN SER CALC-MCNC: 2.1 G/DL (ref 1.5–4.5)
GLUCOSE SERPL-MCNC: ABNORMAL MG/DL
HCT VFR BLD AUTO: 35.1 % (ref 34–46.6)
HGB BLD-MCNC: 12.1 G/DL (ref 11.1–15.9)
IMM GRANULOCYTES # BLD AUTO: 0 X10E3/UL (ref 0–0.1)
IMM GRANULOCYTES NFR BLD AUTO: 0 %
LYMPHOCYTES # BLD AUTO: 1.9 X10E3/UL (ref 0.7–3.1)
LYMPHOCYTES NFR BLD AUTO: 31 %
MCH RBC QN AUTO: 31 PG (ref 26.6–33)
MCHC RBC AUTO-ENTMCNC: 34.5 G/DL (ref 31.5–35.7)
MCV RBC AUTO: 90 FL (ref 79–97)
MONOCYTES # BLD AUTO: 0.6 X10E3/UL (ref 0.1–0.9)
MONOCYTES NFR BLD AUTO: 10 %
NEUTROPHILS # BLD AUTO: 3.6 X10E3/UL (ref 1.4–7)
NEUTROPHILS NFR BLD AUTO: 57 %
PLATELET # BLD AUTO: 262 X10E3/UL (ref 150–450)
POTASSIUM SERPL-SCNC: ABNORMAL MMOL/L
PROT SERPL-MCNC: 6.3 G/DL (ref 6–8.5)
RBC # BLD AUTO: 3.9 X10E6/UL (ref 3.77–5.28)
SODIUM SERPL-SCNC: 141 MMOL/L (ref 134–144)
WBC # BLD AUTO: 6.3 X10E3/UL (ref 3.4–10.8)

## 2021-12-17 NOTE — TELEPHONE ENCOUNTER
----- Message from MISBAH Alves sent at 12/17/2021  9:09 AM EST -----  Please inform the patient her labs are normal.

## 2022-02-11 ENCOUNTER — TELEPHONE (OUTPATIENT)
Dept: GASTROENTEROLOGY | Facility: CLINIC | Age: 42
End: 2022-02-11

## 2022-02-23 NOTE — TELEPHONE ENCOUNTER
ANDREA patient via telephone for EGD/CS. Scheduled 04/18/2022 with arrival time 7:30am. Prep packet mailed to verified address on file. Also advised arrival time may change based on [facility] guidelines. ANDREA Bonds--Lalo

## 2022-04-11 ENCOUNTER — TRANSCRIBE ORDERS (OUTPATIENT)
Dept: ADMINISTRATIVE | Facility: HOSPITAL | Age: 42
End: 2022-04-11

## 2022-04-11 DIAGNOSIS — Z01.818 OTHER SPECIFIED PRE-OPERATIVE EXAMINATION: Primary | ICD-10-CM

## 2022-04-12 ENCOUNTER — TELEPHONE (OUTPATIENT)
Dept: GASTROENTEROLOGY | Facility: CLINIC | Age: 42
End: 2022-04-12

## 2022-04-12 DIAGNOSIS — Z01.818 PRE-OP TESTING: Primary | ICD-10-CM

## 2022-04-20 ENCOUNTER — TRANSCRIBE ORDERS (OUTPATIENT)
Dept: ADMINISTRATIVE | Facility: HOSPITAL | Age: 42
End: 2022-04-20

## 2022-04-20 DIAGNOSIS — Z01.818 OTHER SPECIFIED PRE-OPERATIVE EXAMINATION: Primary | ICD-10-CM

## 2022-04-22 ENCOUNTER — LAB (OUTPATIENT)
Dept: LAB | Facility: HOSPITAL | Age: 42
End: 2022-04-22

## 2022-04-22 DIAGNOSIS — Z01.818 OTHER SPECIFIED PRE-OPERATIVE EXAMINATION: ICD-10-CM

## 2022-04-22 LAB — SARS-COV-2 ORF1AB RESP QL NAA+PROBE: NOT DETECTED

## 2022-04-22 PROCEDURE — C9803 HOPD COVID-19 SPEC COLLECT: HCPCS

## 2022-04-22 PROCEDURE — U0004 COV-19 TEST NON-CDC HGH THRU: HCPCS

## 2022-04-25 ENCOUNTER — HOSPITAL ENCOUNTER (OUTPATIENT)
Facility: HOSPITAL | Age: 42
Setting detail: HOSPITAL OUTPATIENT SURGERY
Discharge: HOME | End: 2022-04-25
Attending: INTERNAL MEDICINE | Admitting: INTERNAL MEDICINE

## 2022-04-25 ENCOUNTER — ANESTHESIA EVENT (OUTPATIENT)
Dept: GASTROENTEROLOGY | Facility: HOSPITAL | Age: 42
End: 2022-04-25

## 2022-04-25 ENCOUNTER — ANESTHESIA (OUTPATIENT)
Dept: GASTROENTEROLOGY | Facility: HOSPITAL | Age: 42
End: 2022-04-25

## 2022-04-25 VITALS
HEIGHT: 63 IN | OXYGEN SATURATION: 96 % | SYSTOLIC BLOOD PRESSURE: 101 MMHG | TEMPERATURE: 97.4 F | WEIGHT: 140 LBS | BODY MASS INDEX: 24.8 KG/M2 | HEART RATE: 63 BPM | RESPIRATION RATE: 16 BRPM | DIASTOLIC BLOOD PRESSURE: 66 MMHG

## 2022-04-25 DIAGNOSIS — K51.211 ULCERATIVE PROCTITIS WITH RECTAL BLEEDING: ICD-10-CM

## 2022-04-25 DIAGNOSIS — R10.13 DYSPEPSIA: ICD-10-CM

## 2022-04-25 DIAGNOSIS — R11.0 NAUSEA: ICD-10-CM

## 2022-04-25 LAB
B-HCG UR QL: NEGATIVE
EXPIRATION DATE: NORMAL
INTERNAL NEGATIVE CONTROL: NORMAL
INTERNAL POSITIVE CONTROL: NORMAL
Lab: NORMAL

## 2022-04-25 PROCEDURE — 43239 EGD BIOPSY SINGLE/MULTIPLE: CPT | Performed by: INTERNAL MEDICINE

## 2022-04-25 PROCEDURE — S0260 H&P FOR SURGERY: HCPCS | Performed by: INTERNAL MEDICINE

## 2022-04-25 PROCEDURE — 45380 COLONOSCOPY AND BIOPSY: CPT | Performed by: INTERNAL MEDICINE

## 2022-04-25 PROCEDURE — 25010000002 PROPOFOL 10 MG/ML EMULSION: Performed by: ANESTHESIOLOGY

## 2022-04-25 PROCEDURE — 88305 TISSUE EXAM BY PATHOLOGIST: CPT | Performed by: INTERNAL MEDICINE

## 2022-04-25 PROCEDURE — 81025 URINE PREGNANCY TEST: CPT | Performed by: INTERNAL MEDICINE

## 2022-04-25 RX ORDER — NALOXONE HCL 0.4 MG/ML
0.4 VIAL (ML) INJECTION AS NEEDED
Status: DISCONTINUED | OUTPATIENT
Start: 2022-04-25 | End: 2022-04-25 | Stop reason: HOSPADM

## 2022-04-25 RX ORDER — LIDOCAINE HYDROCHLORIDE 20 MG/ML
INJECTION, SOLUTION INFILTRATION; PERINEURAL AS NEEDED
Status: DISCONTINUED | OUTPATIENT
Start: 2022-04-25 | End: 2022-04-25 | Stop reason: SURG

## 2022-04-25 RX ORDER — ONDANSETRON 2 MG/ML
4 INJECTION INTRAMUSCULAR; INTRAVENOUS ONCE AS NEEDED
Status: DISCONTINUED | OUTPATIENT
Start: 2022-04-25 | End: 2022-04-25 | Stop reason: HOSPADM

## 2022-04-25 RX ORDER — LABETALOL HYDROCHLORIDE 5 MG/ML
5 INJECTION, SOLUTION INTRAVENOUS
Status: DISCONTINUED | OUTPATIENT
Start: 2022-04-25 | End: 2022-04-25 | Stop reason: HOSPADM

## 2022-04-25 RX ORDER — SODIUM CHLORIDE 0.9 % (FLUSH) 0.9 %
10 SYRINGE (ML) INJECTION AS NEEDED
Status: DISCONTINUED | OUTPATIENT
Start: 2022-04-25 | End: 2022-04-25 | Stop reason: HOSPADM

## 2022-04-25 RX ORDER — DIPHENHYDRAMINE HYDROCHLORIDE 50 MG/ML
6.25 INJECTION INTRAMUSCULAR; INTRAVENOUS
Status: DISCONTINUED | OUTPATIENT
Start: 2022-04-25 | End: 2022-04-25 | Stop reason: HOSPADM

## 2022-04-25 RX ORDER — HYDRALAZINE HYDROCHLORIDE 20 MG/ML
10 INJECTION INTRAMUSCULAR; INTRAVENOUS
Status: DISCONTINUED | OUTPATIENT
Start: 2022-04-25 | End: 2022-04-25 | Stop reason: HOSPADM

## 2022-04-25 RX ORDER — EPHEDRINE SULFATE 50 MG/ML
5 INJECTION, SOLUTION INTRAVENOUS ONCE AS NEEDED
Status: DISCONTINUED | OUTPATIENT
Start: 2022-04-25 | End: 2022-04-25 | Stop reason: HOSPADM

## 2022-04-25 RX ORDER — PROPOFOL 10 MG/ML
VIAL (ML) INTRAVENOUS AS NEEDED
Status: DISCONTINUED | OUTPATIENT
Start: 2022-04-25 | End: 2022-04-25 | Stop reason: SURG

## 2022-04-25 RX ORDER — FENTANYL CITRATE 50 UG/ML
25 INJECTION, SOLUTION INTRAMUSCULAR; INTRAVENOUS
Status: DISCONTINUED | OUTPATIENT
Start: 2022-04-25 | End: 2022-04-25 | Stop reason: HOSPADM

## 2022-04-25 RX ORDER — SODIUM CHLORIDE, SODIUM LACTATE, POTASSIUM CHLORIDE, CALCIUM CHLORIDE 600; 310; 30; 20 MG/100ML; MG/100ML; MG/100ML; MG/100ML
30 INJECTION, SOLUTION INTRAVENOUS CONTINUOUS PRN
Status: DISCONTINUED | OUTPATIENT
Start: 2022-04-25 | End: 2022-04-25 | Stop reason: HOSPADM

## 2022-04-25 RX ORDER — SODIUM CHLORIDE 0.9 % (FLUSH) 0.9 %
10 SYRINGE (ML) INJECTION EVERY 12 HOURS SCHEDULED
Status: DISCONTINUED | OUTPATIENT
Start: 2022-04-25 | End: 2022-04-25 | Stop reason: HOSPADM

## 2022-04-25 RX ADMIN — PROPOFOL 140 MG: 10 INJECTION, EMULSION INTRAVENOUS at 13:09

## 2022-04-25 RX ADMIN — LIDOCAINE HYDROCHLORIDE 60 MG: 20 INJECTION, SOLUTION INFILTRATION; PERINEURAL at 13:09

## 2022-04-25 RX ADMIN — SODIUM CHLORIDE, POTASSIUM CHLORIDE, SODIUM LACTATE AND CALCIUM CHLORIDE 30 ML/HR: 600; 310; 30; 20 INJECTION, SOLUTION INTRAVENOUS at 11:23

## 2022-04-25 RX ADMIN — PROPOFOL 160 MCG/KG/MIN: 10 INJECTION, EMULSION INTRAVENOUS at 13:09

## 2022-04-25 NOTE — BRIEF OP NOTE
ESOPHAGOGASTRODUODENOSCOPY, COLONOSCOPY  Progress Note    Dione Dick  4/25/2022    Pre-op Diagnosis:   Ulcerative proctitis with rectal bleeding (HCC) [K51.211]  Dyspepsia [R10.13]  Nausea [R11.0]       Post-Op Diagnosis Codes:     * Ulcerative proctitis with rectal bleeding (HCC) [K51.211]     * Dyspepsia [R10.13]     * Nausea [R11.0]     * Internal hemorrhoids [K64.8]     * Gastritis [K29.70]    Procedure/CPT® Codes:        Procedure(s):  ESOPHAGOGASTRODUODENOSCOPY with biopsies  COLONOSCOPY to cecum and TI with biopsies    Surgeon(s):  Salvador Street MD    Anesthesia: Monitored Anesthesia Care    Staff:   Endo Technician: Alejandro Tomlin PCT  Endo Nurse: Karissa Beyer RN         Estimated Blood Loss: minimal    Urine Voided: * No values recorded between 4/25/2022  1:01 PM and 4/25/2022  1:33 PM *    Specimens:                Specimens     ID Source Type Tests Collected By Collected At Frozen?    A Large Intestine, Right / Ascending Colon Tissue · TISSUE PATHOLOGY EXAM   Salvador Street MD 4/25/22 1318     B Large Intestine, Left / Descending Colon Tissue · TISSUE PATHOLOGY EXAM   Salvador Street MD 4/25/22 1322     C Large Intestine, Rectum Tissue · TISSUE PATHOLOGY EXAM   Salvador Street MD 4/25/22 1323     D Small Intestine, Duodenum Tissue · TISSUE PATHOLOGY EXAM   Salvador Street MD 4/25/22 1331     Description: r/o celiac    E Gastric, Antrum Tissue · TISSUE PATHOLOGY EXAM   Salvador Street MD 4/25/22 1332                 Drains: * No LDAs found *    Findings: Colonoscopy to the terminal ileum with normal mucosa.  Internal hemorrhoids were noted.  Biopsies of the ascending descending and rectum obtained to rule out microscopic colitis.  EGD with biopsy revealed normal duodenum, biopsies taken to rule out celiac.  Antral gastritis was noted patchy, biopsies the antrum obtained.  Retroflexed view the GE junction was normal as was the esophagus.    Complications:  None          Salvador Street MD     Date: 4/25/2022  Time: 13:36 EDT

## 2022-04-25 NOTE — ANESTHESIA POSTPROCEDURE EVALUATION
"Patient: Dione Dick    Procedure Summary     Date: 04/25/22 Room / Location:  [facility]    Anesthesia Start: 1303 Anesthesia Stop: 1339    Procedures:       ESOPHAGOGASTRODUODENOSCOPY with biopsies (N/A Esophagus)      COLONOSCOPY to cecum and TI with biopsies (N/A ) Diagnosis:       Ulcerative proctitis with rectal bleeding (HCC)      Dyspepsia      Nausea      Internal hemorrhoids      Gastritis      (Ulcerative proctitis with rectal bleeding (HCC) [K51.211])      (Dyspepsia [R10.13])      (Nausea [R11.0])    Surgeons: Salvador Street MD Provider: Jose Klein MD    Anesthesia Type: MAC ASA Status: 2          Anesthesia Type: MAC    Vitals  Vitals Value Taken Time   BP 98/65 04/25/22 1340   Temp     Pulse 82 04/25/22 1340   Resp 16 04/25/22 1340   SpO2 100 % 04/25/22 1340           Post Anesthesia Care and Evaluation    Patient location during evaluation: bedside  Patient participation: complete - patient participated  Level of consciousness: sleepy but conscious  Pain management: adequate  Airway patency: patent  Anesthetic complications: No anesthetic complications    Cardiovascular status: acceptable  Respiratory status: acceptable  Hydration status: acceptable    Comments: *BP 98/65 (BP Location: Left arm, Patient Position: Lying)   Pulse 82   Resp 16   Ht 160 cm (63\")   Wt 63.5 kg (140 lb)   SpO2 100%   BMI 24.80 kg/m²         "

## 2022-04-25 NOTE — H&P
[facility]  Pre Procedure History & Physical    Chief Complaint:   Rectal bleeding with nausea    Subjective     HPI:   Patient 41-year-old female with history of recurrent rectal bleeding.  Patient diagnosed with ulcerative proctitis in the past though last colonoscopy pathologically was normal.  Patient complaining of intermittent nausea here for EGD and colonoscopy.    Past Medical History:   Past Medical History:   Diagnosis Date   • Anemia    • UC (ulcerative colitis) (HCC)     dx in 2006       Past Surgical History:  Past Surgical History:   Procedure Laterality Date   • COLONOSCOPY  12/02/2011    revealed some active colitis (found in a note from [facility] records in [facility])   • COLONOSCOPY N/A 3/19/2018    Diverticulosis   • ENDOSCOPY N/A 3/19/2018    HH       Family History:  Family History   Problem Relation Age of Onset   • Malig Hyperthermia Neg Hx        Social History:   reports that she has never smoked. She has never used smokeless tobacco. She reports current alcohol use. She reports that she does not use drugs.    Medications:   Medications Prior to Admission   Medication Sig Dispense Refill Last Dose   • ascorbic acid (VITAMIN C) 500 MG tablet Take 500 mg by mouth Daily.   Past Week at Unknown time   • cetirizine (zyrTEC) 10 MG tablet Take 10 mg by mouth Daily.   Past Week at Unknown time   • DIGESTIVE ENZYMES PO Take 1 capsule by mouth 2 (Two) Times a Day.   Past Week at Unknown time   • Famotidine-Ca Carb-Mag Hydrox (PEPCID COMPLETE PO) Take  by mouth Daily.   Past Week at Unknown time   • MAGNESIUM CITRATE PO Take  by mouth.   Past Week at Unknown time   • mesalamine (LIALDA) 1.2 g EC tablet Take 4 tablets by mouth Daily. 360 tablet 3 Past Week at Unknown time   • raNITIdine (ZANTAC) 150 MG tablet Take 1 tablet by mouth 2 (Two) Times a Day. 60 tablet 11 Past Week at Unknown time   • TURMERIC PO Take  by mouth.   Past Week at Unknown time       Allergies:  Patient has no  "known allergies.    ROS:    Pertinent items are noted in HPI     Objective     Blood pressure 107/74, pulse 75, resp. rate 16, height 160 cm (63\"), weight 63.5 kg (140 lb), SpO2 99 %.    Physical Exam   Constitutional: Pt is oriented to person, place, and time and well-developed, well-nourished, and in no distress.   Mouth/Throat: Oropharynx is clear and moist.   Neck: Normal range of motion.   Cardiovascular: Normal rate, regular rhythm and normal heart sounds.    Pulmonary/Chest: Effort normal and breath sounds normal.   Abdominal: Soft. Nontender  Skin: Skin is warm and dry.   Psychiatric: Mood, memory, affect and judgment normal.     Assessment/Plan     Diagnosis:  Rectal bleeding  Nausea    Anticipated Surgical Procedure:  EGD and colonoscopy    The risks, benefits, and alternatives of this procedure have been discussed with the patient or the responsible party- the patient understands and agrees to proceed.                                                          "

## 2022-04-25 NOTE — DISCHARGE INSTRUCTIONS
For the next 24 hours patient needs to be with a responsible adult.    For 24 hours DO NOT drive, operate machinery, appliances, drink alcohol, make important decisions or sign legal documents.    Start with a light or bland diet if you are feeling sick to your stomach otherwise advance to regular diet as tolerated.    Follow recommendations on procedure report if provided by your doctor.    Call Dr HUTTON for problems [phone]    Problems may include but not limited to: large amounts of bleeding, trouble breathing, repeated vomiting, severe unrelieved pain, fever or chills.

## 2022-04-25 NOTE — ANESTHESIA PREPROCEDURE EVALUATION
Anesthesia Evaluation     no history of anesthetic complications:               Airway   Mallampati: II  Neck ROM: full  no difficulty expected  Dental - normal exam     Pulmonary - negative pulmonary ROS and normal exam   (-) COPD, asthma, sleep apnea, not a smoker    PE comment: nonlabored  Cardiovascular - negative cardio ROS and normal exam    Rhythm: regular  Rate: normal    (-) hypertension, valvular problems/murmurs, past MI, CAD, dysrhythmias, angina      Neuro/Psych- negative ROS  (-) seizures, TIA, CVA  GI/Hepatic/Renal/Endo    (+)  GERD, GI bleeding ,   (-) liver disease, no renal disease, diabetes, no thyroid disorder    ROS Comment: Ulcerative colitis    Musculoskeletal (-) negative ROS    Abdominal    Substance History      OB/GYN          Other   blood dyscrasia anemia,                     Anesthesia Plan    ASA 2     MAC       Anesthetic plan, all risks, benefits, and alternatives have been provided, discussed and informed consent has been obtained with: patient.        CODE STATUS:

## 2022-04-26 LAB
LAB AP CASE REPORT: NORMAL
PATH REPORT.FINAL DX SPEC: NORMAL
PATH REPORT.GROSS SPEC: NORMAL

## 2022-05-12 ENCOUNTER — TELEPHONE (OUTPATIENT)
Dept: GASTROENTEROLOGY | Facility: CLINIC | Age: 42
End: 2022-05-12

## 2022-05-12 NOTE — TELEPHONE ENCOUNTER
----- Message from Salvador Street MD sent at 5/12/2022 12:56 PM EDT -----  Biopsies were normal, continue medication and call for symptoms.

## 2023-06-09 DIAGNOSIS — K51.211 ULCERATIVE PROCTITIS WITH RECTAL BLEEDING: ICD-10-CM

## 2023-06-16 RX ORDER — MESALAMINE 1.2 G/1
TABLET, DELAYED RELEASE ORAL
Qty: 360 TABLET | Refills: 3 | Status: SHIPPED | OUTPATIENT
Start: 2023-06-16

## 2024-11-07 ENCOUNTER — OFFICE VISIT (OUTPATIENT)
Dept: GASTROENTEROLOGY | Facility: CLINIC | Age: 44
End: 2024-11-07
Payer: COMMERCIAL

## 2024-11-07 VITALS
DIASTOLIC BLOOD PRESSURE: 79 MMHG | HEIGHT: 63 IN | BODY MASS INDEX: 26.4 KG/M2 | HEART RATE: 69 BPM | TEMPERATURE: 97.6 F | WEIGHT: 149 LBS | SYSTOLIC BLOOD PRESSURE: 112 MMHG

## 2024-11-07 DIAGNOSIS — K51.20 ULCERATIVE PROCTITIS WITHOUT COMPLICATION: ICD-10-CM

## 2024-11-07 LAB
ALBUMIN SERPL-MCNC: 4.2 G/DL (ref 3.5–5.2)
ALBUMIN/GLOB SERPL: 1.6 G/DL
ALP SERPL-CCNC: 56 U/L (ref 39–117)
ALT SERPL-CCNC: 13 U/L (ref 1–33)
AST SERPL-CCNC: 18 U/L (ref 1–32)
BILIRUB SERPL-MCNC: 0.4 MG/DL (ref 0–1.2)
BUN SERPL-MCNC: 10 MG/DL (ref 6–20)
BUN/CREAT SERPL: 11.5 (ref 7–25)
CALCIUM SERPL-MCNC: 9.3 MG/DL (ref 8.6–10.5)
CHLORIDE SERPL-SCNC: 105 MMOL/L (ref 98–107)
CO2 SERPL-SCNC: 26.7 MMOL/L (ref 22–29)
CREAT SERPL-MCNC: 0.87 MG/DL (ref 0.57–1)
CRP SERPL-MCNC: <0.3 MG/DL (ref 0–0.5)
EGFRCR SERPLBLD CKD-EPI 2021: 84.4 ML/MIN/1.73
ERYTHROCYTE [DISTWIDTH] IN BLOOD BY AUTOMATED COUNT: 11.3 % (ref 12.3–15.4)
ERYTHROCYTE [SEDIMENTATION RATE] IN BLOOD BY WESTERGREN METHOD: 2 MM/HR (ref 0–20)
GLOBULIN SER CALC-MCNC: 2.7 GM/DL
GLUCOSE SERPL-MCNC: 87 MG/DL (ref 65–99)
HCT VFR BLD AUTO: 38.8 % (ref 34–46.6)
HGB BLD-MCNC: 12.9 G/DL (ref 12–15.9)
MCH RBC QN AUTO: 29.9 PG (ref 26.6–33)
MCHC RBC AUTO-ENTMCNC: 33.2 G/DL (ref 31.5–35.7)
MCV RBC AUTO: 89.8 FL (ref 79–97)
PLATELET # BLD AUTO: 308 10*3/MM3 (ref 140–450)
POTASSIUM SERPL-SCNC: 4.6 MMOL/L (ref 3.5–5.2)
PROT SERPL-MCNC: 6.9 G/DL (ref 6–8.5)
RBC # BLD AUTO: 4.32 10*6/MM3 (ref 3.77–5.28)
SODIUM SERPL-SCNC: 140 MMOL/L (ref 136–145)
WBC # BLD AUTO: 5.86 10*3/MM3 (ref 3.4–10.8)

## 2024-11-07 PROCEDURE — 99213 OFFICE O/P EST LOW 20 MIN: CPT | Performed by: NURSE PRACTITIONER

## 2024-11-07 RX ORDER — MESALAMINE 4 G/60ML
4 SUSPENSION RECTAL SEE ADMIN INSTRUCTIONS
Qty: 30 ENEMA | Refills: 5 | Status: SHIPPED | OUTPATIENT
Start: 2024-11-07

## 2024-11-07 RX ORDER — MESALAMINE 1.2 G/1
4.8 TABLET, DELAYED RELEASE ORAL DAILY
Qty: 360 TABLET | Refills: 3 | Status: SHIPPED | OUTPATIENT
Start: 2024-11-07

## 2024-11-07 NOTE — PROGRESS NOTES
Chief Complaint   Patient presents with    Ulcerative proctitis       HPI    Dione Dick is a  44 y.o. female here with a history of GERD for a follow up visit for ulcerative proctitis diagnosed in 2006.    This patient was last seen and evaluated by Dr. Street in 2023, she is known to me.    On visit today she reports feeling quite well.  She gets flareup of symptoms once or twice a year which manifest as scant rectal bleeding and passage of mucus.  For the most part she is maintained on 2 tablets a day of Lialda.  She does occasionally take 4 a day when she feels a flareup coming on.  She keeps enemas on hand in the form of Rowasa but finds she only has to use these once or twice a year.  No abdominal pain, rectal pain, diarrhea or constipation reported on visit today.    Additional data reviewed:    EGD 2022 - Gastritis otherwise normal.    C-scope 2022 - Internal hemorrhoids otherwise normal.    Past Medical History:   Diagnosis Date    Anemia     GERD (gastroesophageal reflux disease)     UC (ulcerative colitis)     dx in 2006       Past Surgical History:   Procedure Laterality Date    COLONOSCOPY  12/02/2011    revealed some active colitis (found in a note from [facility] records in [facility])    COLONOSCOPY N/A 03/19/2018    Diverticulosis    COLONOSCOPY N/A 04/25/2022    Procedure: COLONOSCOPY to cecum and TI with biopsies;  Surgeon: Salvador Street MD;  Location: [facility] ENDOSCOPY;  Service: Gastroenterology;  Laterality: N/A;  Pre: rectal bleeding  Post: hemorrhoids    ENDOSCOPY N/A 03/19/2018        ENDOSCOPY N/A 04/25/2022    Procedure: ESOPHAGOGASTRODUODENOSCOPY with biopsies;  Surgeon: Salvador Street MD;  Location: [facility] ENDOSCOPY;  Service: Gastroenterology;  Laterality: N/A;  Pre: dyspepsia, nausea  Post: gastritis    UPPER GASTROINTESTINAL ENDOSCOPY         Scheduled Meds:     Continuous Infusions: No current facility-administered medications for this visit.      PRN Meds:     No Known  Allergies    Social History     Socioeconomic History    Marital status:    Tobacco Use    Smoking status: Never    Smokeless tobacco: Never   Vaping Use    Vaping status: Never Used   Substance and Sexual Activity    Alcohol use: Yes     Alcohol/week: 2.0 - 4.0 standard drinks of alcohol     Types: 1 - 2 Glasses of wine, 1 - 2 Cans of beer per week     Comment: SOCIAL    Drug use: No    Sexual activity: Yes     Partners: Male     Birth control/protection: I.U.D.       Family History   Problem Relation Age of Onset    Malig Hyperthermia Neg Hx        Review of Systems   HENT:  Negative for trouble swallowing.    Gastrointestinal: Negative.        Vitals:    11/07/24 0850   BP: 112/79   Pulse: 69   Temp: 97.6 °F (36.4 °C)       Physical Exam  Constitutional:       Appearance: She is well-developed.   Abdominal:      General: Bowel sounds are normal. There is no distension.      Palpations: Abdomen is soft. There is no mass.      Tenderness: There is no abdominal tenderness. There is no guarding.      Hernia: No hernia is present.   Skin:     General: Skin is warm and dry.      Capillary Refill: Capillary refill takes less than 2 seconds.   Neurological:      Mental Status: She is alert and oriented to person, place, and time.   Psychiatric:         Behavior: Behavior normal.     Assessment    Diagnoses and all orders for this visit:    1. Ulcerative proctitis without complication  Overview:  Added automatically from request for surgery [id number]    Orders:  -     mesalamine (LIALDA) 1.2 g EC tablet; Take 4 tablets by mouth Daily.  Dispense: 360 tablet; Refill: 3  -     CBC (No Diff)  -     Comprehensive Metabolic Panel  -     C-reactive Protein  -     Sedimentation Rate    Other orders  -     mesalamine (ROWASA) 4 g enema; Insert 1 enema into the rectum See Admin Instructions. One enema in rectum nightly, try and retain overnight  Dispense: 30 enema; Refill: 5    Plan    Stable ulcerative proctitis continue  current dose of Lialda, refill provided  Refill of Rowasa enemas provided for occasional symptoms  Labs today as above  RTC 1 year or sooner if needed         MISBAH Alves  [facility]  [address] [place] [address]  Office: [phone]

## (undated) DEVICE — TBG 02 CRUSH RESIST LF CLR 7FT

## (undated) DEVICE — LN SMPL CO2 SHTRM SD STREAM W/M LUER

## (undated) DEVICE — THE TORRENT IRRIGATION SCOPE CONNECTOR IS USED WITH THE TORRENT IRRIGATION TUBING TO PROVIDE IRRIGATION FLUIDS SUCH AS STERILE WATER DURING GASTROINTESTINAL ENDOSCOPIC PROCEDURES WHEN USED IN CONJUNCTION WITH AN IRRIGATION PUMP (OR ELECTROSURGICAL UNIT).: Brand: TORRENT

## (undated) DEVICE — Device: Brand: DEFENDO AIR/WATER/SUCTION AND BIOPSY VALVE

## (undated) DEVICE — ADAPT CLN BIOGUARD AIR/H2O DISP

## (undated) DEVICE — KT ORCA ORCAPOD DISP STRL

## (undated) DEVICE — FRCP BX RADJAW4 NDL 2.8 240CM LG OG BX40

## (undated) DEVICE — TUBING, SUCTION, 1/4" X 10', STRAIGHT: Brand: MEDLINE

## (undated) DEVICE — CANN NASL CO2 TRULINK W/O2 A/

## (undated) DEVICE — BITEBLOCK OMNI BLOC

## (undated) DEVICE — CANN O2 ETCO2 FITS ALL CONN CO2 SMPL A/ 7IN DISP LF

## (undated) DEVICE — SENSR O2 OXIMAX FNGR A/ 18IN NONSTR